# Patient Record
Sex: MALE | Race: WHITE | NOT HISPANIC OR LATINO | Employment: FULL TIME | URBAN - METROPOLITAN AREA
[De-identification: names, ages, dates, MRNs, and addresses within clinical notes are randomized per-mention and may not be internally consistent; named-entity substitution may affect disease eponyms.]

---

## 2021-09-11 ENCOUNTER — HOSPITAL ENCOUNTER (EMERGENCY)
Facility: CLINIC | Age: 26
Discharge: HOME | End: 2021-09-11
Attending: PHYSICIAN ASSISTANT | Admitting: PHYSICIAN ASSISTANT
Payer: COMMERCIAL

## 2021-09-11 VITALS
OXYGEN SATURATION: 98 % | DIASTOLIC BLOOD PRESSURE: 73 MMHG | TEMPERATURE: 98.2 F | HEART RATE: 57 BPM | SYSTOLIC BLOOD PRESSURE: 106 MMHG | RESPIRATION RATE: 18 BRPM

## 2021-09-11 DIAGNOSIS — S81.012A LACERATION OF LEFT KNEE, INITIAL ENCOUNTER: ICD-10-CM

## 2021-09-11 PROCEDURE — 12004 RPR S/N/AX/GEN/TRK7.6-12.5CM: CPT | Performed by: PHYSICIAN ASSISTANT

## 2021-09-11 PROCEDURE — 90471 IMMUNIZATION ADMIN: CPT | Performed by: PHYSICIAN ASSISTANT

## 2021-09-11 PROCEDURE — G0463 HOSPITAL OUTPT CLINIC VISIT: HCPCS | Mod: 25 | Performed by: PHYSICIAN ASSISTANT

## 2021-09-11 PROCEDURE — 250N000011 HC RX IP 250 OP 636: Performed by: PHYSICIAN ASSISTANT

## 2021-09-11 PROCEDURE — 90715 TDAP VACCINE 7 YRS/> IM: CPT | Performed by: PHYSICIAN ASSISTANT

## 2021-09-11 PROCEDURE — 99203 OFFICE O/P NEW LOW 30 MIN: CPT | Mod: 25 | Performed by: PHYSICIAN ASSISTANT

## 2021-09-11 RX ORDER — IBUPROFEN 200 MG
400 TABLET ORAL
COMMUNITY

## 2021-09-11 RX ORDER — CEPHALEXIN 500 MG/1
500 CAPSULE ORAL 3 TIMES DAILY
Qty: 15 CAPSULE | Refills: 0 | Status: SHIPPED | OUTPATIENT
Start: 2021-09-11 | End: 2021-09-16

## 2021-09-11 RX ADMIN — CLOSTRIDIUM TETANI TOXOID ANTIGEN (FORMALDEHYDE INACTIVATED), CORYNEBACTERIUM DIPHTHERIAE TOXOID ANTIGEN (FORMALDEHYDE INACTIVATED), BORDETELLA PERTUSSIS TOXOID ANTIGEN (GLUTARALDEHYDE INACTIVATED), BORDETELLA PERTUSSIS FILAMENTOUS HEMAGGLUTININ ANTIGEN (FORMALDEHYDE INACTIVATED), BORDETELLA PERTUSSIS PERTACTIN ANTIGEN, AND BORDETELLA PERTUSSIS FIMBRIAE 2/3 ANTIGEN 0.5 ML: 5; 2; 2.5; 5; 3; 5 INJECTION, SUSPENSION INTRAMUSCULAR at 12:32

## 2021-09-11 ASSESSMENT — ENCOUNTER SYMPTOMS
NUMBNESS: 0
WOUND: 1
WEAKNESS: 0

## 2021-09-11 NOTE — DISCHARGE INSTRUCTIONS
After care instructions:  Keep wound clean and dry for the next 24-48 hours  Sutures out in 12-14 days  Signs of infection discussed today  Apply anti-bacterial ointment for 4 days  May return to work as long as wound is kept clean and dry  Discussed the probability of scarring  Active range of motion exercises encouraged  Crutches as needed for next 2 days.     Use Medication as directed    Tetanus given in office today     Follow up with PCP or return to care in 12-14 days.    Return to clinic sooner or go to Emergency Room if symptoms worsen or change or signs of infection occur (redness, red streaking, warmth, increased pain, increased swelling, purulent drainage, or fevers occur.)    May use acetaminophen, ibuprofen as needed.    Patient voiced understanding of instructions given.

## 2021-09-11 NOTE — ED PROVIDER NOTES
History     Chief Complaint   Patient presents with     Laceration     left knee lac      HPI  Kita Alfaro is a 26 year old male who presents today with left knee laceration that occurred about 3 hours ago. Patient states he was trying to close and lock the fence gate at his cousins house today when he slipped and cut his left knee on the joselito metal hinge. Patient states he has full range of motion with no concerns for joint or bony involvement. Patient states his last Tdap was in 2006 and will be up dated today. Patient denies numbness. Patient states he cleaned it and bandaged it at home, but was told by cousins wife that it needed stitches.     8cm laceration to left knee with 13 sutures placed.     Allergies:  No Known Allergies    Problem List:    There are no problems to display for this patient.       Past Medical History:    History reviewed. No pertinent past medical history.    Past Surgical History:    History reviewed. No pertinent surgical history.    Family History:    History reviewed. No pertinent family history.    Social History:  Marital Status:  Single [1]  Social History     Tobacco Use     Smoking status: Current Every Day Smoker     Types: Cigarettes, Vaping Device     Smokeless tobacco: Never Used   Substance Use Topics     Alcohol use: Yes     Drug use: Yes        Medications:    cephALEXin (KEFLEX) 500 MG capsule  ibuprofen (ADVIL/MOTRIN) 200 MG tablet          Review of Systems   Skin: Positive for wound (8cm laceration to the left anterior knee over the inferior patellar tendon. ).   Neurological: Negative for weakness and numbness.   All other systems reviewed and are negative.      Physical Exam   BP: 106/73  Pulse: 57  Temp: 98.2  F (36.8  C)  Resp: 18  SpO2: 98 %      Physical Exam  Vitals and nursing note reviewed.   Constitutional:       General: He is not in acute distress.     Appearance: Normal appearance. He is normal weight. He is not ill-appearing or toxic-appearing.    Musculoskeletal:         General: Tenderness and signs of injury present. No swelling.      Left knee: Laceration present. No swelling, effusion, erythema, ecchymosis, bony tenderness or crepitus. Normal range of motion. Tenderness present. Normal alignment, normal meniscus and normal patellar mobility.      Right lower leg: No edema.      Left lower leg: No edema.        Legs:       Comments: Patient neurovascularly intact with normal pulses to lower extremities.   Skin:     General: Skin is warm.      Capillary Refill: Capillary refill takes less than 2 seconds.      Comments: Positive 8cm flap laceration to the left anterior knee.    Neurological:      General: No focal deficit present.      Mental Status: He is alert and oriented to person, place, and time.      GCS: GCS eye subscore is 4. GCS verbal subscore is 5. GCS motor subscore is 6.      Sensory: Sensation is intact. No sensory deficit.      Motor: Motor function is intact. No weakness.      Gait: Gait is intact. Gait normal.   Psychiatric:         Mood and Affect: Mood normal.         Behavior: Behavior normal.         Thought Content: Thought content normal.         Judgment: Judgment normal.         ED Course        [facility]    -Laceration Repair    Date/Time: 9/11/2021 2:26 PM  Performed by: Digna Moraes PA-C  Authorized by: Digna Moraes PA-C       ANESTHESIA (see MAR for exact dosages):     Anesthesia method:  Local infiltration    Local anesthetic:  Lidocaine 1% w/o epi  LACERATION DETAILS     Location:  Leg    Leg location:  L knee    Length (cm):  8    Depth (mm):  4    REPAIR TYPE:     Repair type:  Simple      EXPLORATION:     Wound exploration: wound explored through full range of motion and entire depth of wound probed and visualized      Wound extent: fascia not violated, no foreign body, no signs of injury, no tendon damage, no underlying fracture and no vascular damage      Contaminated: no       TREATMENT:     Area cleansed with:  Hibiclens and saline    Amount of cleaning:  Extensive    Irrigation solution:  Sterile saline    Irrigation method:  Syringe    SKIN REPAIR     Repair method:  Sutures    Suture size:  3-0    Suture material:  Nylon    Suture technique:  Simple interrupted    Number of sutures:  13    APPROXIMATION     Approximation:  Close    POST-PROCEDURE DETAILS     Dressing:  Antibiotic ointment and bulky dressing      PROCEDURE   Patient Tolerance:  Patient tolerated the procedure well with no immediate complications                   Critical Care time:  none               No results found for this or any previous visit (from the past 24 hour(s)).    Medications   Tdap (tetanus-diphtheria-acell pertussis) (ADACEL) injection 0.5 mL (0.5 mLs Intramuscular Given 9/11/21 1232)       Assessments & Plan (with Medical Decision Making)     I have reviewed the nursing notes.    I have reviewed the findings, diagnosis, plan and need for follow up with the patient.   26-year-old male presents the urgent care today with laceration to the left knee.  See exam findings above.  13 sutures placed in the urgent care today without complications.  No tendon injury, bony involvement, fascia injury.  Tdap was updated in office today.  Patient sent home with Keflex for 5 days to prevent secondary infection since wound was deep.  Patient sent home with crutches to use for the next 1 to 2 days and informed ice, rest, elevate, Tylenol and ibuprofen over-the-counter as needed.  Patient return if signs or symptoms of infection occur.  These were discussed and given on discharge paperwork.  Patient discharged in stable condition with no indication for imaging at this time.  Commend sutures removed in 12 to 14 days.    Discharge Medication List as of 9/11/2021  1:09 PM      START taking these medications    Details   cephALEXin (KEFLEX) 500 MG capsule Take 1 capsule (500 mg) by mouth 3 times daily for 5 days,  Disp-15 capsule, R-0, E-Prescribe             Final diagnoses:   Laceration of left knee, initial encounter       9/11/2021   [facility] EMERGENCY DEPT     Digna Moraes PA-C  09/11/21 1594